# Patient Record
Sex: FEMALE | Race: WHITE | NOT HISPANIC OR LATINO | URBAN - METROPOLITAN AREA
[De-identification: names, ages, dates, MRNs, and addresses within clinical notes are randomized per-mention and may not be internally consistent; named-entity substitution may affect disease eponyms.]

---

## 2022-05-31 ENCOUNTER — TELEPHONE (OUTPATIENT)
Dept: OBSTETRICS AND GYNECOLOGY | Facility: CLINIC | Age: 22
End: 2022-05-31
Payer: COMMERCIAL

## 2022-05-31 DIAGNOSIS — Z12.31 SCREENING MAMMOGRAM, ENCOUNTER FOR: Primary | ICD-10-CM

## 2022-05-31 NOTE — TELEPHONE ENCOUNTER
----- Message from Ruddy Perkins sent at 5/31/2022 12:52 PM CDT -----  Caller is requesting a same day appointment. Caller declined first available appointment listed below.        Name of Caller:Lolita        When is the first available appointment?n/a        Symptoms:New Pregnancy         Best Call Back Number:[phone]        Additional Information:

## 2022-05-31 NOTE — TELEPHONE ENCOUNTER
----- Message from Orquidea Tirado sent at 5/31/2022  2:07 PM CDT -----  Regarding: Return Call  Type: Patient Returning Call            Who Called: ROGER ROWLEY [[id number]]            Who Left Message for Patient: Margaux             Does the patient know what this is regarding? Yes            Best Call Back Number: [phone]

## 2022-06-03 ENCOUNTER — PROCEDURE VISIT (OUTPATIENT)
Dept: OBSTETRICS AND GYNECOLOGY | Facility: CLINIC | Age: 22
End: 2022-06-03
Payer: MEDICARE

## 2022-06-03 DIAGNOSIS — Z12.31 SCREENING MAMMOGRAM, ENCOUNTER FOR: ICD-10-CM

## 2022-06-03 PROCEDURE — 76801 OB US < 14 WKS SINGLE FETUS: CPT | Mod: S$GLB,,, | Performed by: OBSTETRICS & GYNECOLOGY

## 2022-06-03 PROCEDURE — 76801 US OB/GYN PROCEDURE (VIEWPOINT): ICD-10-PCS | Mod: S$GLB,,, | Performed by: OBSTETRICS & GYNECOLOGY
